# Patient Record
Sex: FEMALE | Race: WHITE | NOT HISPANIC OR LATINO | URBAN - METROPOLITAN AREA
[De-identification: names, ages, dates, MRNs, and addresses within clinical notes are randomized per-mention and may not be internally consistent; named-entity substitution may affect disease eponyms.]

---

## 2020-07-15 ENCOUNTER — APPOINTMENT (OUTPATIENT)
Age: 40
Setting detail: DERMATOLOGY
End: 2020-07-15

## 2020-07-15 VITALS — WEIGHT: 200 LBS | HEIGHT: 63 IN | TEMPERATURE: 97.9 F

## 2020-07-15 DIAGNOSIS — D22 MELANOCYTIC NEVI: ICD-10-CM

## 2020-07-15 DIAGNOSIS — L82.1 OTHER SEBORRHEIC KERATOSIS: ICD-10-CM

## 2020-07-15 DIAGNOSIS — L57.8 OTHER SKIN CHANGES DUE TO CHRONIC EXPOSURE TO NONIONIZING RADIATION: ICD-10-CM

## 2020-07-15 DIAGNOSIS — L91.8 OTHER HYPERTROPHIC DISORDERS OF THE SKIN: ICD-10-CM

## 2020-07-15 PROBLEM — D22.5 MELANOCYTIC NEVI OF TRUNK: Status: ACTIVE | Noted: 2020-07-15

## 2020-07-15 PROBLEM — D22.39 MELANOCYTIC NEVI OF OTHER PARTS OF FACE: Status: ACTIVE | Noted: 2020-07-15

## 2020-07-15 PROCEDURE — 99203 OFFICE O/P NEW LOW 30 MIN: CPT

## 2020-07-15 PROCEDURE — OTHER COUNSELING: OTHER

## 2020-07-15 PROCEDURE — OTHER MIPS QUALITY: OTHER

## 2020-07-15 PROCEDURE — OTHER FOLLOW UP FOR NEXT VISIT: OTHER

## 2020-07-15 PROCEDURE — OTHER REASSURANCE: OTHER

## 2020-07-15 PROCEDURE — OTHER SUNSCREEN RECOMMENDATIONS: OTHER

## 2020-07-15 ASSESSMENT — LOCATION ZONE DERM
LOCATION ZONE: FACE
LOCATION ZONE: AXILLAE
LOCATION ZONE: TRUNK

## 2020-07-15 ASSESSMENT — LOCATION DETAILED DESCRIPTION DERM
LOCATION DETAILED: RIGHT SUPERIOR MEDIAL MIDBACK
LOCATION DETAILED: INFERIOR MID FOREHEAD
LOCATION DETAILED: SUPERIOR THORACIC SPINE
LOCATION DETAILED: UPPER STERNUM
LOCATION DETAILED: RIGHT AXILLARY VAULT
LOCATION DETAILED: RIGHT CENTRAL TEMPLE

## 2020-07-15 ASSESSMENT — LOCATION SIMPLE DESCRIPTION DERM
LOCATION SIMPLE: RIGHT AXILLARY VAULT
LOCATION SIMPLE: INFERIOR FOREHEAD
LOCATION SIMPLE: CHEST
LOCATION SIMPLE: RIGHT TEMPLE
LOCATION SIMPLE: RIGHT LOWER BACK
LOCATION SIMPLE: UPPER BACK

## 2020-07-15 NOTE — HPI: SKIN LESIONS
How Severe Is Your Skin Lesion?: mild
Have Your Skin Lesions Been Treated?: not been treated
Is This A New Presentation, Or A Follow-Up?: Skin Lesions
Additional History: Patient here for her annual full body skin exam. She has seen dermatology in the past. She has no specific lesions that are changing or bothering her. She does spend time in the sun but is good about sunscreen use and sun protection. No previous history of pre-cancer, skin cancer or melanoma

## 2020-07-15 NOTE — PROCEDURE: REASSURANCE
Additional Note: Normal FBSE, pt counseled regarding sunscreen and how to use properly. Follow up annually.
Additional Notes (Optional): Pt reassured that lesion is benign and no treatment needed. Follow up if lesion changes or becomes irritated.
Include Location In Plan?: Yes
Detail Level: Simple
Hide Include Location In Plan Question?: No
Detail Level: Generalized
Additional Note: Normal FBSE, no abnormal moles or suspicious lesions. Follow up if lesions change in size, shape, or color. Otherwise follow up annually.
Additional Note: Pt reassured lesion is benign and no treatment needed. Follow up if lesion becomes irritated or removal is desired.